# Patient Record
Sex: MALE | Race: WHITE | NOT HISPANIC OR LATINO | URBAN - METROPOLITAN AREA
[De-identification: names, ages, dates, MRNs, and addresses within clinical notes are randomized per-mention and may not be internally consistent; named-entity substitution may affect disease eponyms.]

---

## 2023-01-31 ENCOUNTER — OFFICE (OUTPATIENT)
Dept: URBAN - METROPOLITAN AREA CLINIC 67 | Facility: CLINIC | Age: 83
End: 2023-01-31
Payer: MEDICARE

## 2023-01-31 VITALS
HEART RATE: 91 BPM | OXYGEN SATURATION: 98 % | SYSTOLIC BLOOD PRESSURE: 96 MMHG | DIASTOLIC BLOOD PRESSURE: 62 MMHG | WEIGHT: 123 LBS | HEIGHT: 67 IN

## 2023-01-31 DIAGNOSIS — R13.19 OTHER DYSPHAGIA: ICD-10-CM

## 2023-01-31 DIAGNOSIS — R62.7 ADULT FAILURE TO THRIVE: ICD-10-CM

## 2023-01-31 PROCEDURE — 99204 OFFICE O/P NEW MOD 45 MIN: CPT

## 2023-02-01 ENCOUNTER — AMBULATORY SURGICAL CENTER (OUTPATIENT)
Dept: URBAN - METROPOLITAN AREA SURGERY 19 | Facility: SURGERY | Age: 83
End: 2023-02-01
Payer: OTHER GOVERNMENT

## 2023-02-01 DIAGNOSIS — R13.10 DYSPHAGIA, UNSPECIFIED: ICD-10-CM

## 2023-02-01 LAB
RELEVANT H&P ENDOSCOPY: (no result)
RELEVANT H&P ENDOSCOPY: (no result)

## 2023-02-01 PROCEDURE — 43235 EGD DIAGNOSTIC BRUSH WASH: CPT | Performed by: INTERNAL MEDICINE

## 2024-04-08 ENCOUNTER — OFFICE (OUTPATIENT)
Dept: URBAN - METROPOLITAN AREA CLINIC 84 | Facility: CLINIC | Age: 84
End: 2024-04-08
Payer: MEDICARE

## 2024-04-08 VITALS
WEIGHT: 127.4 LBS | HEART RATE: 87 BPM | HEIGHT: 67 IN | DIASTOLIC BLOOD PRESSURE: 78 MMHG | SYSTOLIC BLOOD PRESSURE: 110 MMHG | OXYGEN SATURATION: 99 %

## 2024-04-08 DIAGNOSIS — R13.12 DYSPHAGIA, OROPHARYNGEAL PHASE: ICD-10-CM

## 2024-04-08 PROCEDURE — 99214 OFFICE O/P EST MOD 30 MIN: CPT | Performed by: INTERNAL MEDICINE

## 2024-04-08 NOTE — SERVICENOTES
I reviewed the EGD done in 2023 which was normal (no luminal abnormalities) and no intervention was done - I explained to the patient and his daughter that I don't have a good explanation as to why his dysphagia seemingly improved transiently after his last EGD. However, given the normal exam in 2023, his VFSS findings and his underlying pulmonary comorbidity, I do not feel that he would benefit from another EGD. 
I have recommended that he reconsider swallow therapy but he again states that he does not want to go through that - he will continue with the mechanical soft diet he has been doing at home. 
For now, I will have him return to see me as needed.

## 2024-04-08 NOTE — SERVICEHPINOTES
Moe Jc   is seen today for a follow-up visit regarding chronic dysphagia.
br
arnol He was recently hospitalized at [facility] secondary to shortness of breath and ongoing dysphagia - during that hospitalization, he underwent a contrasted CT of the neck on 3/30/24 which was without any acute findings as well as a VFSS done on 4/1/24 which demonstrated moderate oropharyngeal dysphagia characterized by inability to clear solid consistencies and increased bolus retention - his swallowing with thick liquids and puree was normal. 
br It was recommended that he drink thin liquids and crush PO meds - swallow therapy was also recommended for laryngeal strengthening and oropharyngeal retraining. Bakari underwent an EGD in 2/2023 (due to dysphagia) which was unremarkable. 
br
arnol   Today, he is accompanied by his daughter and reports that he has refused any type of swallow therapy - he also reports that it seemed like his swallowing improved after his EGD last year. He has been able to tolerate a mechanical soft diet based on the foods that he has been eating since his discharge on 4/5/24 and his weight is stable as compared to 1/2023.